# Patient Record
Sex: FEMALE | Race: WHITE | Employment: UNEMPLOYED | URBAN - METROPOLITAN AREA
[De-identification: names, ages, dates, MRNs, and addresses within clinical notes are randomized per-mention and may not be internally consistent; named-entity substitution may affect disease eponyms.]

---

## 2018-07-18 ENCOUNTER — OFFICE VISIT (OUTPATIENT)
Dept: SURGERY | Facility: CLINIC | Age: 38
End: 2018-07-18
Payer: COMMERCIAL

## 2018-07-18 VITALS
SYSTOLIC BLOOD PRESSURE: 151 MMHG | HEART RATE: 80 BPM | BODY MASS INDEX: 23 KG/M2 | HEIGHT: 62 IN | WEIGHT: 125 LBS | DIASTOLIC BLOOD PRESSURE: 84 MMHG

## 2018-07-18 DIAGNOSIS — K40.90 RIGHT INGUINAL HERNIA: Primary | ICD-10-CM

## 2018-07-18 PROCEDURE — 99204 OFFICE O/P NEW MOD 45 MIN: CPT | Performed by: SURGERY

## 2018-07-18 NOTE — MR AVS SNAPSHOT
"              After Visit Summary   2018    Sofía Bond    MRN: [id number]           Patient Information     Date Of Birth          1980        Visit Information        Provider Department      2018 2:00 PM Mikhail Park MD [facility]      Care Instructions    Patient to follow up with Primary Care provider regarding elevated blood pressure.          Follow-ups after your visit        Who to contact     If you have questions or need follow up information about today's clinic visit or your schedule please contact [facility] directly at [phone].  Normal or non-critical lab and imaging results will be communicated to you by [employer] Cloudhart, letter or phone within 4 business days after the clinic has received the results. If you do not hear from us within 7 days, please contact the clinic through [employer] Cloud[employer] or phone. If you have a critical or abnormal lab result, we will notify you by phone as soon as possible.  Submit refill requests through [employer] or call your pharmacy and they will forward the refill request to us. Please allow 3 business days for your refill to be completed.          Additional Information About Your Visit        MyChart Information     [employer] lets you send messages to your doctor, view your test results, renew your prescriptions, schedule appointments and more. To sign up, go to [email or web address] . Click on \"Log in\" on the left side of the screen, which will take you to the Welcome page. Then click on \"Sign up Now\" on the right side of the page.     You will be asked to enter the access code listed below, as well as some personal information. Please follow the directions to create your username and password.     Your access code is: [email or web address]  Expires: 10/16/2018  2:28 PM     Your access code will  in 90 days. If you need help or a new code, please call your [place] clinic or " "[phone].        Care EveryWhere ID     This is your Care EveryWhere ID. This could be used by other organizations to access your [place] medical records  [id number]        Your Vitals Were     Pulse Height BMI (Body Mass Index)             80 5' 2\" (1.575 m) 22.86 kg/m2          Blood Pressure from Last 3 Encounters:   07/18/18 151/84   06/01/16 130/66    Weight from Last 3 Encounters:   07/18/18 125 lb (56.7 kg)   06/01/16 125 lb (56.7 kg)              Today, you had the following     No orders found for display         Today's Medication Changes          These changes are accurate as of 7/18/18  2:28 PM.  If you have any questions, ask your nurse or doctor.               Stop taking these medicines if you haven't already. Please contact your care team if you have questions.     albuterol 108 (90 Base) MCG/ACT Inhaler   Commonly known as:  PROAIR HFA/PROVENTIL HFA/VENTOLIN HFA   Stopped by:  Mikhail Park MD                    Primary Care Provider Fax #    Physician No Ref-Primary [phone]       No address on file        Equal Access to Services     [facility]: Hadmaria teresa Pugh, wamichelle dominguez, qataylor chandler, valeri goodwin . So [facility] [phone].    ATENCIÓN: Si habla español, tiene a gomez disposición servicios gratuitos de asistencia lingüística. [facility] [phone].    We comply with applicable federal civil rights laws and [place] laws. We do not discriminate on the basis of race, color, national origin, age, disability, sex, sexual orientation, or gender identity.            Thank you!     Thank you for choosing [facility]  for your care. Our goal is always to provide you with excellent care. Hearing back from our patients is one way we can continue to improve our services. Please take a few minutes to complete the written survey that you may receive in the mail after your visit with us. Thank you!             Your " Updated Medication List - Protect others around you: Learn how to safely use, store and throw away your medicines at www.disposemymeds.org.          This list is accurate as of 7/18/18  2:28 PM.  Always use your most recent med list.                   Brand Name Dispense Instructions for use Diagnosis    TURMERIC PO

## 2018-07-19 NOTE — PROGRESS NOTES
"[facility]  Surgery Consultation      HPI: Patient is a 37-year-old female self-referred for evaluation of possible right inguinal hernia.  She reports that she has had discomfort in her right lower quadrant for many years.  She associates this back to a pregnancy approximately 7 years ago.  During that time she had significant increase in the discomfort in her right lower quadrant.  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  3 years ago she started a different exercise regiment and has noted significant increase in discomfort since.  She feels discomfort not so much during exercise but following.  She also notes some increased discomfort during her menstrual cycle particularly after ovulation and with abdominal bloating.  She has had pain with coughing and sneezing as well as straining to have a bowel movement.  She has been exhaustively evaluated for this including multiple imaging studies.  She did undergo an ultrasound in [place] and reportedly was identified as having a small right inguinal hernia.    PMH:   has a past medical history of Uncomplicated asthma.  PSH:    has a past surgical history that includes Parathyroidectomy (2004) and GYN surgery.  Social History:   reports that she has quit smoking. She has never used smokeless tobacco. She reports that she drinks alcohol.  Family History:  family history includes Coronary Artery Disease in her maternal grandfather; Diabetes in her maternal grandmother; Hypertension in her father; Other Cancer in her maternal grandfather.  Medications/Allergies: Home medications and allergies reviewed.    ROS:  The 10 point Review of Systems is negative other than noted in the HPI.    Physical Exam:  /84  Pulse 80  Ht 5' 2\" (1.575 m)  Wt 125 lb (56.7 kg)  BMI 22.86 kg/m2  GENERAL: Generally appears well.  Psych: Alert and Oriented.  Normal affect  Eyes: Sclera clear  Respiratory:  Lungs clear to ausculation bilaterally " with good air excursion  Cardiovascular:  Regular Rate and Rhythm with no murmurs gallops or rubs, normal peripheral pulses  GI: Abdomen Non Distended Non-Tender  No hernias palpated..  Groin- I examined the patient in both the standing and supine positions. Right Groin- Small inguinal hernia. Left Groin- No hernia Palpated.   Lymphatic/Hematologic/Immune:  No femoral or cervical lymphadenopathy.  Integumentary:  No rashes  Neurological: grossly intact     All new lab and imaging data was reviewed.     Impression and Plan:  Patient is a 37 year old female with symptomatic right inguinal hernia    PLAN: Recommend outpatient open repair at her convenience  I discussed the pathophysiology of hernias and options for repair including laparoscopic VS open.  The risks associated with the procedure including, but not limited to, recurrence, nerve entrapment or injury, persistence of pain, injury to the bowel/bladder, infertility, hematoma, mesh migration, mesh infection, MI, and PE were discussed with the patient. She indicated understanding of the discussion, asked appropriate questions, and provided consent. Signs and symptoms of incarceration were discussed. If these develop in the interim, she promises to call or go straight to the ER. I have provided the patient with an information pamphlet.      Thank you very much for this consult.    Mikhail Park M.D.  [facility]  [phone]    Please route or send letter to:  Primary Care Provider (PCP) and Referring Provider

## 2019-08-07 ENCOUNTER — DOCUMENTATION ONLY (OUTPATIENT)
Dept: GASTROENTEROLOGY | Facility: CLINIC | Age: 39
End: 2019-08-07

## 2019-08-07 NOTE — PROGRESS NOTES
Referring Provider and Clinic:      Diagnosis:  Colitis    GI notes or primary provider notes related to GI problem:        Pathology Reports: N    Recent Lab Reports: N    Radiology Reports (CT/MRI) : Y    Endoscopy:      Colonoscopy:         Referral Date: Self-referred    Date complete records received and sent for review:     Date records scanned into epic:     Provider Review Date:     Date review routed back to sender:     Letter sent:     Notes:     Action    Action Taken Requested records from [facility].